# Patient Record
Sex: MALE | Race: OTHER | HISPANIC OR LATINO | URBAN - METROPOLITAN AREA
[De-identification: names, ages, dates, MRNs, and addresses within clinical notes are randomized per-mention and may not be internally consistent; named-entity substitution may affect disease eponyms.]

---

## 2024-05-04 ENCOUNTER — EMERGENCY (EMERGENCY)
Facility: HOSPITAL | Age: 29
LOS: 1 days | Discharge: HOME | End: 2024-05-04
Attending: STUDENT IN AN ORGANIZED HEALTH CARE EDUCATION/TRAINING PROGRAM
Payer: COMMERCIAL

## 2024-05-04 VITALS
WEIGHT: 178.57 LBS | TEMPERATURE: 98 F | OXYGEN SATURATION: 95 % | DIASTOLIC BLOOD PRESSURE: 75 MMHG | HEART RATE: 110 BPM | SYSTOLIC BLOOD PRESSURE: 138 MMHG | RESPIRATION RATE: 18 BRPM | HEIGHT: 67 IN

## 2024-05-04 VITALS
DIASTOLIC BLOOD PRESSURE: 75 MMHG | OXYGEN SATURATION: 98 % | HEART RATE: 60 BPM | SYSTOLIC BLOOD PRESSURE: 121 MMHG | TEMPERATURE: 98 F | RESPIRATION RATE: 18 BRPM

## 2024-05-04 PROCEDURE — 99053 MED SERV 10PM-8AM 24 HR FAC: CPT

## 2024-05-04 PROCEDURE — 99283 EMERGENCY DEPT VISIT LOW MDM: CPT | Mod: 25

## 2024-05-04 PROCEDURE — 73140 X-RAY EXAM OF FINGER(S): CPT | Mod: 26,RT

## 2024-05-04 PROCEDURE — 73140 X-RAY EXAM OF FINGER(S): CPT

## 2024-05-04 PROCEDURE — 99284 EMERGENCY DEPT VISIT MOD MDM: CPT

## 2024-05-04 PROCEDURE — 12002 RPR S/N/AX/GEN/TRNK2.6-7.5CM: CPT

## 2024-05-04 RX ORDER — LIDOCAINE HCL 20 MG/ML
5 VIAL (ML) INJECTION ONCE
Refills: 0 | Status: COMPLETED | OUTPATIENT
Start: 2024-05-04 | End: 2024-05-04

## 2024-05-04 RX ADMIN — Medication 5 MILLILITER(S): at 05:52

## 2024-05-04 NOTE — ED ADULT NURSE NOTE - OBJECTIVE STATEMENT
Pt with lac to right second finger, sp was cut with knife at work today actively bleeding pressure bandage applied.

## 2024-05-04 NOTE — ED PROVIDER NOTE - NSFOLLOWUPINSTRUCTIONS_ED_ALL_ED_FT
You were seen in the emergency department for: right index finger laceration  Sutures were placed in your finger.  Please do not get the site wet for 24 hours. After that, you can wash the area but do not scrub intensely for 2-4 weeks. Do not soak the area - no swimming for 2-4 weeks. Apply sunscreen to prevent scar formation.   Return in 10 to 14 days to have the sutures removed.   We recommend you follow up with: your primary care doctor.     Please return to the Emergency Department if you experience any of the following symptoms:   - Shortness of breath or trouble breathing  - Pressure, pain or tightness in the chest  - Face drooping, arm weakness or speech difficulty  - Persistence of severe vomiting  - Head injury or loss of consciousness  - Nonstop bleeding or an open wound    (1) Follow up with your primary care physician within the next 24-48 hours as discussed. In addition, we did not find evidence of a life threatening illness on your testing here today, but listed below are the specialists that will be necessary to see as an outpatient to continue the workup.  Please call the numbers listed below or [phone] to set up the necessary appointments.  (2) Take Tylenol (up to 1000mg or 1 g)  and/or Motrin (up to 600mg) up to every 6 hours as needed for pain.   (3) If you had an IV (intravenous) line placed, it was removed. Sometimes, after IV removal, that area can be tender for a few days; if it develops redness and swelling, those could be signs of infection; in which case, return to the Emergency Department for assessment.  (4) Please continue taking all of your home medications as directed. Lo atendieron en el departamento de emergencias por: Laceración en el dedo índice derecho.  Le colocaron suturas en el dedo.  No moje el sitio pj 24 horas. Después de eso, puedes elysia el área dariel no frotar intensamente pj 2 a 4 semanas. No remoje el área; no nade pj 2 a 4 semanas. Aplique protector solar para prevenir la formación de cicatrices.  Regrese en 10 a 14 días para que le retiren las suturas.  Le recomendamos realizar un seguimiento con: shin médico de atención primaria.    Regrese al Departamento de Emergencias si experimenta alguno de los siguientes síntomas:  - Dificultad para respirar o dificultad para respirar  - Presión, dolor u opresión en el pecho.  - Carrie caída, debilidad en los brazos o dificultad para hablar  - Persistencia de vómitos intensos.  - Lesión en la alba o pérdida del conocimiento.  - Sangrado continuo o salvador herida abierta.    (1) Brittanie un seguimiento con shin médico de atención primaria dentro de las próximas 24 a 48 horas, según lo comentado. Además, no encontramos evidencia de salvador enfermedad potencialmente mortal en shin prueba aquí hoy, dariel a continuación se enumeran los especialistas que será necesario consultar de forma ambulatoria para continuar con el estudio. Llame a los números que figuran a continuación o al [phone] para programar las citas necesarias.  (2) [place] Tylenol (hasta 1000 mg o 1 g) y/o Motrin (hasta 600 mg) hasta cada 6 horas según sea necesario para el dolor.  (3) Si le colocaron salvador vía intravenosa, se la quitaron. A veces, después de la extracción de la vía intravenosa, delano william puede estar sensible pj unos días; si presenta enrojecimiento e hinchazón, podrían ser signos de infección; en cuyo aj, regresar al Departamento de Emergencias para shin evaluación.  (4) Continúe tomando todos sánchez medicamentos caseros según las indicaciones.      You were seen in the emergency department for: right index finger laceration  Sutures were placed in your finger.  Please do not get the site wet for 24 hours. After that, you can wash the area but do not scrub intensely for 2-4 weeks. Do not soak the area - no swimming for 2-4 weeks. Apply sunscreen to prevent scar formation.   Return in 10 to 14 days to have the sutures removed.   We recommend you follow up with: your primary care doctor.     Please return to the Emergency Department if you experience any of the following symptoms:   - Shortness of breath or trouble breathing  - Pressure, pain or tightness in the chest  - Face drooping, arm weakness or speech difficulty  - Persistence of severe vomiting  - Head injury or loss of consciousness  - Nonstop bleeding or an open wound    (1) Follow up with your primary care physician within the next 24-48 hours as discussed. In addition, we did not find evidence of a life threatening illness on your testing here today, but listed below are the specialists that will be necessary to see as an outpatient to continue the workup.  Please call the numbers listed below or [phone] to set up the necessary appointments.  (2) Take Tylenol (up to 1000mg or 1 g)  and/or Motrin (up to 600mg) up to every 6 hours as needed for pain.   (3) If you had an IV (intravenous) line placed, it was removed. Sometimes, after IV removal, that area can be tender for a few days; if it develops redness and swelling, those could be signs of infection; in which case, return to the Emergency Department for assessment.  (4) Please continue taking all of your home medications as directed.

## 2024-05-04 NOTE — ED PROVIDER NOTE - PATIENT PORTAL LINK FT
You can access the FollowMyHealth Patient Portal offered by [facility] by registering at the following website: [email or web address]. By joining [facility]’s FollowMyHealth portal, you will also be able to view your health information using other applications (apps) compatible with our system.

## 2024-05-04 NOTE — ED PROVIDER NOTE - CLINICAL SUMMARY MEDICAL DECISION MAKING FREE TEXT BOX
28-year-old male no medical hx presenting with right hand index finger laceration - was at work when he cut his finger on a knife. XR normal. Laceration to be repaired. Will d/c

## 2024-05-04 NOTE — ED PROVIDER NOTE - OBJECTIVE STATEMENT
28-year-old male no medical hx presenting with right hand index finger laceration - was at work when he cut his finger on a knife. Denies any other symptoms or injuries. Last TDAP was within the past year.